# Patient Record
Sex: MALE | Race: WHITE | Employment: OTHER | URBAN - NONMETROPOLITAN AREA
[De-identification: names, ages, dates, MRNs, and addresses within clinical notes are randomized per-mention and may not be internally consistent; named-entity substitution may affect disease eponyms.]

---

## 2022-01-10 ENCOUNTER — HOSPITAL ENCOUNTER (EMERGENCY)
Age: 67
Discharge: HOME | End: 2022-01-10
Payer: MEDICARE

## 2022-01-10 VITALS
RESPIRATION RATE: 20 BRPM | WEIGHT: 230 LBS | SYSTOLIC BLOOD PRESSURE: 158 MMHG | BODY MASS INDEX: 34.07 KG/M2 | OXYGEN SATURATION: 97 % | DIASTOLIC BLOOD PRESSURE: 98 MMHG | TEMPERATURE: 97.9 F | HEIGHT: 69 IN | HEART RATE: 78 BPM

## 2022-01-10 DIAGNOSIS — U07.1 COVID-19 VIRUS INFECTION: Primary | ICD-10-CM

## 2022-01-10 LAB
FLU A ANTIGEN: NEGATIVE
FLU B ANTIGEN: NEGATIVE
SARS-COV-2, NAA: DETECTED

## 2022-01-10 PROCEDURE — 87804 INFLUENZA ASSAY W/OPTIC: CPT

## 2022-01-10 PROCEDURE — 99203 OFFICE O/P NEW LOW 30 MIN: CPT

## 2022-01-10 PROCEDURE — 99213 OFFICE O/P EST LOW 20 MIN: CPT | Performed by: NURSE PRACTITIONER

## 2022-01-10 PROCEDURE — 87635 SARS-COV-2 COVID-19 AMP PRB: CPT

## 2022-01-10 RX ORDER — METHYLPREDNISOLONE 4 MG/1
TABLET ORAL
Qty: 21 TABLET | Refills: 0 | Status: SHIPPED | OUTPATIENT
Start: 2022-01-10

## 2022-01-10 ASSESSMENT — PAIN DESCRIPTION - PAIN TYPE: TYPE: ACUTE PAIN

## 2022-01-10 ASSESSMENT — ENCOUNTER SYMPTOMS
COUGH: 1
VOMITING: 0
DIARRHEA: 0
SORE THROAT: 1
NAUSEA: 0
SHORTNESS OF BREATH: 1
SINUS PRESSURE: 1

## 2022-01-10 ASSESSMENT — PAIN DESCRIPTION - ONSET: ONSET: GRADUAL

## 2022-01-10 ASSESSMENT — PAIN DESCRIPTION - FREQUENCY: FREQUENCY: INTERMITTENT

## 2022-01-10 ASSESSMENT — PAIN DESCRIPTION - LOCATION: LOCATION: BACK;CHEST

## 2022-01-10 ASSESSMENT — PAIN SCALES - GENERAL: PAINLEVEL_OUTOF10: 5

## 2022-01-10 ASSESSMENT — PAIN - FUNCTIONAL ASSESSMENT: PAIN_FUNCTIONAL_ASSESSMENT: PREVENTS OR INTERFERES SOME ACTIVE ACTIVITIES AND ADLS

## 2022-01-10 ASSESSMENT — PAIN DESCRIPTION - PROGRESSION: CLINICAL_PROGRESSION: GRADUALLY WORSENING

## 2022-01-10 ASSESSMENT — PAIN DESCRIPTION - ORIENTATION: ORIENTATION: MID

## 2022-01-10 ASSESSMENT — PAIN DESCRIPTION - DESCRIPTORS: DESCRIPTORS: SHARP

## 2022-01-10 NOTE — ED NOTES
Discharge instructions and prescription discussed with pt and pt verbalized understanding of info given. Pt left stable and ambulated to exit.        Alexandra Vogel RN  01/10/22 6787

## 2022-01-10 NOTE — ED PROVIDER NOTES
[address]  Urgent Care Encounter       CHIEF COMPLAINT       Chief Complaint   Patient presents with    Cough    Fatigue    Sinusitis       Nurses Notes reviewed and I agree except as noted in the HPI. HISTORY OF PRESENT ILLNESS   Sanjeev Kay is a 74 y.o. male who presents with complaints of cough, sinus nasal congestion and fatigue, onset 3 days ago. Patient also reports body aches but no fever or chills. No nausea, vomiting or diarrhea. He does have a sore throat but no otalgia. Appetite has been good. Reports he feels more short of breath when he lays down at night. His wife has been ill with respiratory symptoms but has not seen a doctor or been tested for anything. The history is provided by the patient. REVIEW OF SYSTEMS     Review of Systems   Constitutional: Positive for fatigue. Negative for appetite change, chills and fever. HENT: Positive for congestion, sinus pressure and sore throat. Negative for ear pain. Respiratory: Positive for cough and shortness of breath. Cardiovascular: Negative for chest pain. Gastrointestinal: Negative for diarrhea, nausea and vomiting. Musculoskeletal: Positive for myalgias. Skin: Negative for rash. Neurological: Positive for headaches (Occasional). PAST MEDICAL HISTORY         Diagnosis Date    Hyperlipidemia     Hypertension        SURGICALHISTORY     Patient  has a past surgical history that includes Tonsillectomy and Appendectomy. CURRENT MEDICATIONS       Discharge Medication List as of 1/10/2022  1:46 PM      CONTINUE these medications which have NOT CHANGED    Details   DICLOFENAC PO Take 50 mg by mouth. omeprazole (PRILOSEC) 20 MG capsule Take 20 mg by mouth daily. simvastatin (ZOCOR) 40 MG tablet Take 40 mg by mouth nightly. aspirin 81 MG tablet Take 81 mg by mouth daily. metoprolol (TOPROL-XL) 50 MG XL tablet Take 50 mg by mouth daily.              ALLERGIES     Patient is has No Known Allergies. Patients   There is no immunization history on file for this patient. FAMILY HISTORY     Patient's family history is not on file. SOCIAL HISTORY     Patient  reports that he quit smoking about 17 years ago. His smoking use included cigarettes. He has a 5.00 pack-year smoking history. He has never used smokeless tobacco. He reports current alcohol use. He reports current drug use. Frequency: 3.00 times per week. Drug: Marijuana [place] Kevin). PHYSICAL EXAM     ED TRIAGE VITALS  BP: (!) 158/98, Temp: 97.9 °F (36.6 °C), Pulse: 78, Resp: 20, SpO2: 97 %,Estimated body mass index is 33.97 kg/m² as calculated from the following:    Height as of this encounter: 5' 9\" (1.753 m). Weight as of this encounter: 230 lb (104.3 kg). ,No LMP for male patient. Physical Exam  Vitals and nursing note reviewed. Constitutional:       General: He is not in acute distress. Appearance: He is well-developed. He is not ill-appearing. HENT:      Head: Normocephalic and atraumatic. Right Ear: Tympanic membrane and ear canal normal.      Left Ear: Tympanic membrane and ear canal normal.      Nose: Congestion present. Right Sinus: No maxillary sinus tenderness or frontal sinus tenderness. Left Sinus: No maxillary sinus tenderness or frontal sinus tenderness. Mouth/Throat:      Lips: Pink. Mouth: Mucous membranes are moist.      Pharynx: Oropharynx is clear. Uvula midline. Posterior oropharyngeal erythema (Mild) present. Eyes:      General: Lids are normal. No scleral icterus. Conjunctiva/sclera: Conjunctivae normal.      Pupils: Pupils are equal.   Cardiovascular:      Rate and Rhythm: Normal rate and regular rhythm. Heart sounds: Normal heart sounds, S1 normal and S2 normal.   Pulmonary:      Effort: Pulmonary effort is normal. No respiratory distress. Breath sounds: Normal breath sounds.    Musculoskeletal:      Comments: Normal active ROM x 4 extremities  Gait steady Lymphadenopathy:      Comments: No head or neck adenopathy   Skin:     General: Skin is warm and dry. Findings: No rash (to exposed skin). Neurological:      General: No focal deficit present. Mental Status: He is alert and oriented to person, place, and time. Sensory: No sensory deficit. Comments: Answers questions readily and appropriately   Psychiatric:         Mood and Affect: Mood normal.         Speech: Speech normal.         Behavior: Behavior normal. Behavior is cooperative. DIAGNOSTIC RESULTS     Labs:  Results for orders placed or performed during the hospital encounter of 01/10/22   COVID-19, Rapid   Result Value Ref Range    SARS-CoV-2, ALEXA DETECTED (AA) NOT DETECTED   Rapid influenza A/B antigens   Result Value Ref Range    Flu A Antigen Negative NEGATIVE    Flu B Antigen Negative NEGATIVE       IMAGING:    No orders to display         EKG:      URGENT CARE COURSE:     Vitals:    01/10/22 1315   BP: (!) 158/98   Pulse: 78   Resp: 20   Temp: 97.9 °F (36.6 °C)   TempSrc: Temporal   SpO2: 97%   Weight: 230 lb (104.3 kg)   Height: 5' 9\" (1.753 m)       Medications - No data to display         PROCEDURES:  None    FINAL IMPRESSION      1. COVID-19 virus infection          DISPOSITION/ PLAN     Patient tested positive for COVID-19. Treat with Medrol Dosepak. Quarantine for 5days from onset if symptoms have resolved and no fever. Wear a mask for 5 days after the initial 5 days. If symptoms continue or have a fever, quarantine for 10 days from onset of symptoms. If symptoms or fever remain after 10 days, continue quarantine for total of 14 days. Can use over-the-counter medication as desired for symptom management. Can start Vitamin C, D3, and Zinc if desired. Tylenol or Motrin for body aches and fever. Imodium as needed for diarrhea. Drink plenty fluids to maintain good hydration. Wear a mask around others in the home. Good frequent handwashing. Social distancing.

## 2022-01-11 ENCOUNTER — CARE COORDINATION (OUTPATIENT)
Dept: CARE COORDINATION | Age: 67
End: 2022-01-11

## 2022-01-11 NOTE — CARE COORDINATION
First attempt. M call to pt regarding ER / Covid follow up. Phone sounded like answered, but then line d/c. No option to leave voicemail. Called mobile number listed. No answer. Left message for pt requesting return call to [facility].

## 2022-01-12 NOTE — CARE COORDINATION
Second attempt. [facility] call to pt regarding ER / Covid follow up. Home phone number rings, no answer and no option to leave voicemail. Call to mobile number. Left voicemail requesting return call to [facility]. No further outreach to be completed.